# Patient Record
Sex: MALE | Race: WHITE | NOT HISPANIC OR LATINO | Employment: OTHER | URBAN - METROPOLITAN AREA
[De-identification: names, ages, dates, MRNs, and addresses within clinical notes are randomized per-mention and may not be internally consistent; named-entity substitution may affect disease eponyms.]

---

## 2019-02-13 ENCOUNTER — CONSULT (OUTPATIENT)
Dept: CARDIOLOGY | Facility: CLINIC | Age: 79
End: 2019-02-13

## 2019-02-13 VITALS
WEIGHT: 142 LBS | DIASTOLIC BLOOD PRESSURE: 76 MMHG | BODY MASS INDEX: 22.29 KG/M2 | HEIGHT: 67 IN | HEART RATE: 80 BPM | SYSTOLIC BLOOD PRESSURE: 130 MMHG

## 2019-02-13 DIAGNOSIS — I48.19 ATRIAL FIBRILLATION, PERSISTENT (HCC): ICD-10-CM

## 2019-02-13 DIAGNOSIS — I48.91 ATRIAL FIBRILLATION, UNSPECIFIED TYPE (HCC): Primary | ICD-10-CM

## 2019-02-13 DIAGNOSIS — I10 ESSENTIAL HYPERTENSION: ICD-10-CM

## 2019-02-13 DIAGNOSIS — I48.21 PERMANENT ATRIAL FIBRILLATION (HCC): Primary | ICD-10-CM

## 2019-02-13 PROCEDURE — 99203 OFFICE O/P NEW LOW 30 MIN: CPT | Performed by: INTERNAL MEDICINE

## 2019-02-13 RX ORDER — MELOXICAM 15 MG/1
15 TABLET ORAL DAILY
COMMUNITY

## 2019-02-13 RX ORDER — TRAMADOL HYDROCHLORIDE 50 MG/1
50 TABLET ORAL EVERY 12 HOURS PRN
COMMUNITY

## 2019-02-13 RX ORDER — ASPIRIN 81 MG/1
81 TABLET, CHEWABLE ORAL DAILY
COMMUNITY
End: 2020-02-19

## 2019-02-13 NOTE — PROGRESS NOTES
"Subjective:     Encounter Date:02/13/2019    Primary Care Physician: Rad Garvin DO      Patient ID: Garth Cunningham is a 78 y.o. male.    Chief Complaint:Irregular Heart Beat (consult)    PROBLEM LIST:  1. Atrial fibrillation  a. New diagnosis 2019  b. CHADSVASC- 3  2. COPD  3. Hypertension   4. Arthritis  5. Surgeries:  a. Appendectomy  b. Bilateral cataract extraction  c. Carpal tunnel surgery      No Known Allergies      Current Outpatient Medications:   •  aspirin 81 MG chewable tablet, Chew 81 mg Daily., Disp: , Rfl:   •  meloxicam (MOBIC) 15 MG tablet, Take 15 mg by mouth Daily., Disp: , Rfl:   •  METOPROLOL SUCCINATE ER PO, Take 50 mg by mouth Daily., Disp: , Rfl:   •  traMADol (ULTRAM) 50 MG tablet, Take 50 mg by mouth Every 12 (Twelve) Hours As Needed for Moderate Pain ., Disp: , Rfl:         History of Present Illness    Patient is a 78-year-old  male who we are seeing today for new diagnosis of atrial fibrillation.  He was seeing his primary care physician for a wellness exam.  At that time he was noted to have an irregular rhythm.  EKG was performed which showed atrial fibrillation.  Patient denies any chest pain, pressure, tightness.  He denies any increasing shortness of breath.  No syncope, near-syncope, or edema.  His primary complaint is of pain in his back, hips, and legs.  He was started on aspirin therapy for his atrial fibrillation.  Patient notes that he is unaware of any palpitations or arrhythmia.  He was told a long time ago by previous primary care physician that he had had a \"mild heart attack\" due to his EKG findings.  He reports that there is never been any further testing performed.    The following portions of the patient's history were reviewed and updated as appropriate: allergies, current medications, past family history, past medical history, past social history, past surgical history and problem list.    Family History   Family history unknown: Yes " "      Social History     Tobacco Use   • Smoking status: Current Every Day Smoker     Packs/day: 0.75     Start date: 1955   • Smokeless tobacco: Never Used   Substance Use Topics   • Alcohol use: No     Frequency: Never   • Drug use: Not on file         Review of Systems   Constitution: Negative for fever, weakness and malaise/fatigue.   HENT: Negative for nosebleeds.    Eyes: Positive for blurred vision. Negative for redness and visual disturbance.   Cardiovascular: Negative for orthopnea, palpitations and paroxysmal nocturnal dyspnea.   Respiratory: Negative for cough, snoring, sputum production and wheezing.    Hematologic/Lymphatic: Negative for bleeding problem.   Skin: Negative for flushing, itching and rash.   Musculoskeletal: Positive for arthritis, back pain, joint pain and muscle weakness. Negative for falls and muscle cramps.   Gastrointestinal: Positive for dysphagia. Negative for abdominal pain, diarrhea, heartburn, nausea and vomiting.   Genitourinary: Negative for hematuria.   Neurological: Negative for excessive daytime sleepiness, dizziness, headaches and tremors.   Psychiatric/Behavioral: Negative for substance abuse. The patient is not nervous/anxious.           Objective:    height is 170.2 cm (67\") and weight is 64.4 kg (142 lb). His blood pressure is 130/76 and his pulse is 80.         Physical Exam   Constitutional: He is oriented to person, place, and time. He appears well-developed and well-nourished.   HENT:   Head: Normocephalic and atraumatic.   Mouth/Throat: Oropharynx is clear and moist.   Eyes: Conjunctivae are normal. Pupils are equal, round, and reactive to light.   Neck: Normal carotid pulses and no JVD present. Carotid bruit is not present. No thyromegaly present.   Cardiovascular: Normal rate, S1 normal and S2 normal. An irregularly irregular rhythm present. Exam reveals no gallop and no friction rub.   No murmur heard.  Pulses:       Carotid pulses are 2+ on the right side, and " 2+ on the left side.       Dorsalis pedis pulses are 2+ on the right side, and 2+ on the left side.        Posterior tibial pulses are 2+ on the right side, and 2+ on the left side.   Pulmonary/Chest: No respiratory distress. He has no wheezes. He has no rales. He exhibits no tenderness.   Abdominal: He exhibits no distension, no abdominal bruit and no mass. There is no hepatosplenomegaly. There is no tenderness. There is no rebound.   Musculoskeletal: He exhibits no edema, tenderness or deformity.   Lymphadenopathy:     He has no cervical adenopathy.   Neurological: He is alert and oriented to person, place, and time. He has normal strength.   Skin: Skin is warm and dry. No rash noted. No cyanosis. Nails show no clubbing.   Psychiatric: He has a normal mood and affect. Cognition and memory are normal.         ECG 12 Lead  Date/Time: 2/13/2019 3:39 PM  Performed by: Jose Albarran MD  Authorized by: Jose Albarran MD   Rhythm: atrial fibrillation  Conduction: left anterior fascicular block                  Assessment:   Assessment/Plan      Edward was seen today for irregular heart beat.    Diagnoses and all orders for this visit:    Permanent atrial fibrillation (CMS/HCC)    Essential hypertension    Other orders  -     ECG 12 Lead      1.  Atrial fibrillation unclear duration.  Asymptomatic.  Rate controlled.  Chads fast 3.  2.  Hypertension controlled    Recommendations: Given his asymptomatic status, and high chads score would recommend lifelong Xarelto.  20 mg daily at bedtime samples given.  Also needs echocardiogram to complete work up.  Continue beta blocker for rate control.       Griselda GOODE scribed portions of this dictation for  Dr. Albarran.  I, Jose Albarran MD, personally performed the services described in this documentation as scribed by the above individual in my presence, and it is both accurate and complete    Dictated utilizing Dragon dictation

## 2019-04-03 ENCOUNTER — APPOINTMENT (OUTPATIENT)
Dept: CARDIOLOGY | Facility: HOSPITAL | Age: 79
End: 2019-04-03

## 2019-04-03 ENCOUNTER — HOSPITAL ENCOUNTER (OUTPATIENT)
Dept: CARDIOLOGY | Facility: HOSPITAL | Age: 79
Discharge: HOME | End: 2019-04-03
Admitting: INTERNAL MEDICINE

## 2019-04-03 VITALS — BODY MASS INDEX: 22.29 KG/M2 | HEIGHT: 67 IN | WEIGHT: 142 LBS

## 2019-04-03 DIAGNOSIS — I10 ESSENTIAL HYPERTENSION: ICD-10-CM

## 2019-04-03 DIAGNOSIS — I48.19 ATRIAL FIBRILLATION, PERSISTENT (HCC): ICD-10-CM

## 2019-04-03 LAB
BH CV ECHO MEAS - AO ROOT AREA (BSA CORRECTED): 2.2
BH CV ECHO MEAS - AO ROOT AREA: 11.3 CM^2
BH CV ECHO MEAS - AO ROOT DIAM: 3.8 CM
BH CV ECHO MEAS - BSA(HAYCOCK): 1.7 M^2
BH CV ECHO MEAS - BSA: 1.7 M^2
BH CV ECHO MEAS - BZI_BMI: 22.2 KILOGRAMS/M^2
BH CV ECHO MEAS - BZI_METRIC_HEIGHT: 170.2 CM
BH CV ECHO MEAS - BZI_METRIC_WEIGHT: 64.4 KG
BH CV ECHO MEAS - EDV(CUBED): 69.9 ML
BH CV ECHO MEAS - EDV(MOD-SP2): 99 ML
BH CV ECHO MEAS - EDV(MOD-SP4): 92 ML
BH CV ECHO MEAS - EDV(TEICH): 75.1 ML
BH CV ECHO MEAS - EF(CUBED): 65.1 %
BH CV ECHO MEAS - EF(MOD-BP): 57 %
BH CV ECHO MEAS - EF(MOD-SP2): 55.6 %
BH CV ECHO MEAS - EF(MOD-SP4): 58.7 %
BH CV ECHO MEAS - EF(TEICH): 57.1 %
BH CV ECHO MEAS - ESV(CUBED): 24.4 ML
BH CV ECHO MEAS - ESV(MOD-SP2): 44 ML
BH CV ECHO MEAS - ESV(MOD-SP4): 38 ML
BH CV ECHO MEAS - ESV(TEICH): 32.2 ML
BH CV ECHO MEAS - FS: 29.6 %
BH CV ECHO MEAS - IVS/LVPW: 0.65
BH CV ECHO MEAS - IVSD: 0.57 CM
BH CV ECHO MEAS - LAD MAJOR: 5.3 CM
BH CV ECHO MEAS - LAT PEAK E' VEL: 12.2 CM/SEC
BH CV ECHO MEAS - LATERAL E/E' RATIO: 8.9
BH CV ECHO MEAS - LV DIASTOLIC VOL/BSA (35-75): 52.6 ML/M^2
BH CV ECHO MEAS - LV IVRT: 0.07 SEC
BH CV ECHO MEAS - LV MASS(C)D: 86.3 GRAMS
BH CV ECHO MEAS - LV MASS(C)DI: 49.3 GRAMS/M^2
BH CV ECHO MEAS - LV SYSTOLIC VOL/BSA (12-30): 21.7 ML/M^2
BH CV ECHO MEAS - LVIDD: 4.1 CM
BH CV ECHO MEAS - LVIDS: 2.9 CM
BH CV ECHO MEAS - LVLD AP2: 7.2 CM
BH CV ECHO MEAS - LVLD AP4: 7.8 CM
BH CV ECHO MEAS - LVLS AP2: 6 CM
BH CV ECHO MEAS - LVLS AP4: 6.1 CM
BH CV ECHO MEAS - LVOT AREA (M): 4.2 CM^2
BH CV ECHO MEAS - LVOT AREA: 4.2 CM^2
BH CV ECHO MEAS - LVOT DIAM: 2.3 CM
BH CV ECHO MEAS - LVPWD: 0.88 CM
BH CV ECHO MEAS - MED PEAK E' VEL: 10.5 CM/SEC
BH CV ECHO MEAS - MEDIAL E/E' RATIO: 10.4
BH CV ECHO MEAS - MV A MAX VEL: 43.9 CM/SEC
BH CV ECHO MEAS - MV DEC TIME: 0.18 SEC
BH CV ECHO MEAS - MV E MAX VEL: 109 CM/SEC
BH CV ECHO MEAS - MV E/A: 2.5
BH CV ECHO MEAS - PA ACC SLOPE: 760 CM/SEC^2
BH CV ECHO MEAS - PA ACC TIME: 0.09 SEC
BH CV ECHO MEAS - PA PR(ACCEL): 37.6 MMHG
BH CV ECHO MEAS - PI END-D VEL: 109.3 CM/SEC
BH CV ECHO MEAS - PULM DIAS VEL: 35.9 CM/SEC
BH CV ECHO MEAS - PULM S/D: 2.1
BH CV ECHO MEAS - PULM SYS VEL: 74.9 CM/SEC
BH CV ECHO MEAS - RAP SYSTOLE: 3 MMHG
BH CV ECHO MEAS - RVSP: 33 MMHG
BH CV ECHO MEAS - SI(CUBED): 26.1 ML/M^2
BH CV ECHO MEAS - SI(MOD-SP2): 31.5 ML/M^2
BH CV ECHO MEAS - SI(MOD-SP4): 30.9 ML/M^2
BH CV ECHO MEAS - SI(TEICH): 24.5 ML/M^2
BH CV ECHO MEAS - SV(CUBED): 45.5 ML
BH CV ECHO MEAS - SV(MOD-SP2): 55 ML
BH CV ECHO MEAS - SV(MOD-SP4): 54 ML
BH CV ECHO MEAS - SV(TEICH): 42.9 ML
BH CV ECHO MEAS - TAPSE (>1.6): 1.8 CM2
BH CV ECHO MEAS - TR MAX PG: 30 MMHG
BH CV ECHO MEAS - TR MAX VEL: 276 CM/SEC
BH CV ECHO MEASUREMENTS AVERAGE E/E' RATIO: 9.6
BH CV VAS BP LEFT ARM: NORMAL MMHG
BH CV XLRA - RV BASE: 4.8 CM
BH CV XLRA - RV MID: 3.6 CM
BH CV XLRA - TDI S': 14.1 CM/SEC
LEFT ATRIUM VOLUME INDEX: 51.5 ML/M^2
LEFT ATRIUM VOLUME: 90 ML
LV EF 2D ECHO EST: 60 %

## 2019-04-03 PROCEDURE — 93306 TTE W/DOPPLER COMPLETE: CPT | Performed by: INTERNAL MEDICINE

## 2019-04-03 PROCEDURE — 93306 TTE W/DOPPLER COMPLETE: CPT

## 2019-04-11 ENCOUNTER — TELEPHONE (OUTPATIENT)
Dept: CARDIOLOGY | Facility: CLINIC | Age: 79
End: 2019-04-11

## 2019-04-11 NOTE — TELEPHONE ENCOUNTER
----- Message from RUSSEL Bui sent at 4/11/2019  1:00 PM EDT -----  Normal EF, mild MR. No significant abnormality  ----- Message -----  From: Siomara Cadet RN  Sent: 4/11/2019  12:33 PM  To: RUSSEL Bui    Patient calling for results of echo

## 2020-02-18 NOTE — PROGRESS NOTES
Subjective:     Encounter Date:02/19/2020    Primary Care Physician: Rad Garvin DO      Patient ID: Garth Cunningham is a 79 y.o. male.    Chief Complaint:Follow-up    PROBLEM LIST:  1. Atrial fibrillation  a. New diagnosis 2019  b. CHADSVASC- 3  c. 4/2019 echo EF 60%. Mild MR  2. COPD  3. Hypertension   4. Arthritis  5. Surgeries:  a. Appendectomy  b. Bilateral cataract extraction  c. Carpal tunnel surgery      No Known Allergies      Current Outpatient Medications:   •  meloxicam (MOBIC) 15 MG tablet, Take 15 mg by mouth Daily., Disp: , Rfl:   •  METOPROLOL SUCCINATE ER PO, Take 50 mg by mouth Daily., Disp: , Rfl:   •  rivaroxaban (XARELTO) 20 MG tablet, Take 1 tablet by mouth Daily., Disp: 30 tablet, Rfl: 11  •  traMADol (ULTRAM) 50 MG tablet, Take 50 mg by mouth Every 12 (Twelve) Hours As Needed for Moderate Pain ., Disp: , Rfl:         History of Present Illness    Patient returns today for follow-up of his atrial fibrillation.  Since her last visit he denies any tachypalpitations presyncope syncope.  He apparently was in the emergency room approximately 1 month ago with some chest pain, was diagnosed with costochondritis and possible pneumonia.  In retrospect patient notes this occurs after he and his friend attend a new barn lifting heavy sheets over his head for most of the day.  His pain is now resolved.  There is no exertional component to it at this time.  Issue primarily is cost of his anticoagulation.    The following portions of the patient's history were reviewed and updated as appropriate: allergies, current medications, past family history, past medical history, past social history, past surgical history and problem list.      Social History     Tobacco Use   • Smoking status: Current Every Day Smoker     Packs/day: 0.75     Start date: 1955   • Smokeless tobacco: Never Used   Substance Use Topics   • Alcohol use: No     Frequency: Never   • Drug use: Not on file         Review of  "Systems   Constitution: Negative.   Cardiovascular: Negative.    Respiratory: Negative.    Hematologic/Lymphatic: Negative for bleeding problem. Does not bruise/bleed easily.   Skin: Negative for rash.   Musculoskeletal: Positive for arthritis. Negative for muscle weakness and myalgias.   Gastrointestinal: Negative for heartburn, nausea and vomiting.   Neurological: Negative.           Objective:   /78 (BP Location: Left arm, Patient Position: Sitting)   Pulse 69   Ht 177.8 cm (70\")   Wt 63 kg (139 lb)   SpO2 97%   BMI 19.94 kg/m²         Physical Exam   Constitutional: He is oriented to person, place, and time. He appears well-developed and well-nourished.   HENT:   Mouth/Throat: Oropharynx is clear and moist.   Neck: No JVD present. Carotid bruit is not present. No thyromegaly present.   Cardiovascular: Regular rhythm, S1 normal, S2 normal, normal heart sounds and intact distal pulses. Exam reveals no gallop, no S3 and no S4.   No murmur heard.  Pulses:       Carotid pulses are 2+ on the right side, and 2+ on the left side.       Radial pulses are 2+ on the right side, and 2+ on the left side.   Pulmonary/Chest: Breath sounds normal.   Abdominal: Soft. Bowel sounds are normal. He exhibits no mass. There is no tenderness.   Musculoskeletal: He exhibits no edema.   Neurological: He is alert and oriented to person, place, and time.   Skin: Skin is warm and dry. No rash noted.       Procedures          Assessment:   Assessment/Plan      Garth was seen today for follow-up.    Diagnoses and all orders for this visit:    Paroxysmal atrial fibrillation (CMS/HCC)    Other chest pain      1.  Paroxysmal H fibrillation, on chronic anticoagulation.  Rate controlled  2.  Hypertension recently well-controlled for age  3.  COPD mildly symptomatic  4.  Chest pain musculoskeletal, resolved    Recommendations 1.  Xarelto samples and patient assistance forms given.  Revisit annually or PRN symptom change     Jose " MD Avis      Dictated utilizing Dragon dictation

## 2020-02-19 ENCOUNTER — OFFICE VISIT (OUTPATIENT)
Dept: CARDIOLOGY | Facility: CLINIC | Age: 80
End: 2020-02-19

## 2020-02-19 VITALS
BODY MASS INDEX: 19.9 KG/M2 | HEART RATE: 69 BPM | SYSTOLIC BLOOD PRESSURE: 142 MMHG | HEIGHT: 70 IN | OXYGEN SATURATION: 97 % | WEIGHT: 139 LBS | DIASTOLIC BLOOD PRESSURE: 78 MMHG

## 2020-02-19 DIAGNOSIS — I48.0 PAROXYSMAL ATRIAL FIBRILLATION (HCC): Primary | ICD-10-CM

## 2020-02-19 DIAGNOSIS — R07.89 OTHER CHEST PAIN: ICD-10-CM

## 2020-02-19 PROCEDURE — 99213 OFFICE O/P EST LOW 20 MIN: CPT | Performed by: INTERNAL MEDICINE

## 2020-04-15 RX ORDER — RIVAROXABAN 20 MG/1
TABLET, FILM COATED ORAL
Qty: 30 TABLET | Refills: 11 | Status: SHIPPED | OUTPATIENT
Start: 2020-04-15 | End: 2021-05-04 | Stop reason: SDUPTHER

## 2021-05-04 ENCOUNTER — OFFICE VISIT (OUTPATIENT)
Dept: CARDIOLOGY | Facility: CLINIC | Age: 81
End: 2021-05-04

## 2021-05-04 VITALS
DIASTOLIC BLOOD PRESSURE: 60 MMHG | SYSTOLIC BLOOD PRESSURE: 108 MMHG | BODY MASS INDEX: 23.07 KG/M2 | WEIGHT: 138.5 LBS | HEART RATE: 96 BPM | OXYGEN SATURATION: 98 % | HEIGHT: 65 IN

## 2021-05-04 DIAGNOSIS — I10 ESSENTIAL HYPERTENSION: ICD-10-CM

## 2021-05-04 DIAGNOSIS — I48.0 PAROXYSMAL ATRIAL FIBRILLATION (HCC): Primary | ICD-10-CM

## 2021-05-04 PROCEDURE — 99213 OFFICE O/P EST LOW 20 MIN: CPT | Performed by: INTERNAL MEDICINE

## 2021-05-04 NOTE — PROGRESS NOTES
"Subjective:     Encounter Date:05/04/2021    Primary Care Physician: Rad Garvin DO      Patient ID: Garth Cunningham is a 80 y.o. male.    Chief Complaint:Follow-up    PROBLEM LIST:  1. Atrial fibrillation  a. New diagnosis 2019  b. CHADSVASC- 3  c. 4/2019 echo EF 60%. Mild MR  2. COPD  3. Hypertension   4. Arthritis  5. Surgeries:  a. Appendectomy  b. Bilateral cataract extraction  c. Carpal tunnel surgery        No Known Allergies      Current Outpatient Medications:   •  meloxicam (MOBIC) 15 MG tablet, Take 15 mg by mouth Daily., Disp: , Rfl:   •  METOPROLOL SUCCINATE ER PO, Take 50 mg by mouth Daily., Disp: , Rfl:   •  traMADol (ULTRAM) 50 MG tablet, Take 50 mg by mouth Every 12 (Twelve) Hours As Needed for Moderate Pain ., Disp: , Rfl:   •  XARELTO 20 MG tablet, Take 1 tablet by mouth once daily, Disp: 30 tablet, Rfl: 11        History of Present Illness    Patient returns for annual follow-up of atrial fibrillation and hypertension since her last visit he is doing very well.  Is very active without chest pain shortness of breath tachypalpitations presyncope or syncope.  Traumatic surgery to the right hand, significant bleeding.    The following portions of the patient's history were reviewed and updated as appropriate: allergies, current medications, past family history, past medical history, past social history, past surgical history and problem list.      Social History     Tobacco Use   • Smoking status: Current Every Day Smoker     Packs/day: 0.75     Start date: 1955   • Smokeless tobacco: Never Used   Substance Use Topics   • Alcohol use: No   • Drug use: Not on file         ROS       Objective:   /60   Pulse 96   Ht 165.1 cm (65\")   Wt 62.8 kg (138 lb 8 oz)   SpO2 98%   BMI 23.05 kg/m²         Vitals reviewed.   Constitutional:       Appearance: Well-developed and not in distress.   Neck:      Thyroid: No thyromegaly.      Vascular: No carotid bruit or JVD.   Pulmonary:      " Breath sounds: Normal breath sounds.   Cardiovascular:      Regular rhythm.      No gallop. No S3 and S4 gallop.   Abdominal:      General: Bowel sounds are normal.      Palpations: Abdomen is soft. There is no abdominal mass.      Tenderness: There is no abdominal tenderness.   Musculoskeletal:         General: No deformity.      Extremities: No clubbing present.Skin:     General: Skin is warm and dry.      Findings: No rash.   Neurological:      Mental Status: Alert and oriented to person, place, and time.         Procedures          Assessment:   Assessment/Plan      Diagnoses and all orders for this visit:    1. Paroxysmal atrial fibrillation (CMS/HCC) (Primary)    2. Essential hypertension      1.  Paroxysmal atrial relation rate control and fully anticoagulated   2 hypertension currently well controlled on beta-blocker  3.  Dyslipidemia last LDL of 90  4.  COPD    Recommendations:  1.  Continue current medical therapy  2.  Issues with cost of Xarelto will attempt patient assistance programs and samples given.  3.  Revisit annually apparent symptom change       Jose Albarran MD      Dictated utilizing Dragon dictation

## 2022-05-17 RX ORDER — RIVAROXABAN 20 MG/1
TABLET, FILM COATED ORAL
Qty: 30 TABLET | Refills: 5 | Status: SHIPPED | OUTPATIENT
Start: 2022-05-17 | End: 2022-12-27

## 2022-06-01 ENCOUNTER — OFFICE VISIT (OUTPATIENT)
Dept: CARDIOLOGY | Facility: CLINIC | Age: 82
End: 2022-06-01

## 2022-06-01 VITALS
HEART RATE: 58 BPM | OXYGEN SATURATION: 97 % | HEIGHT: 65 IN | DIASTOLIC BLOOD PRESSURE: 70 MMHG | BODY MASS INDEX: 21.66 KG/M2 | WEIGHT: 130 LBS | SYSTOLIC BLOOD PRESSURE: 110 MMHG

## 2022-06-01 DIAGNOSIS — I10 ESSENTIAL HYPERTENSION: ICD-10-CM

## 2022-06-01 DIAGNOSIS — R63.4 WEIGHT LOSS: ICD-10-CM

## 2022-06-01 DIAGNOSIS — I48.0 PAROXYSMAL ATRIAL FIBRILLATION: Primary | ICD-10-CM

## 2022-06-01 PROCEDURE — 99214 OFFICE O/P EST MOD 30 MIN: CPT | Performed by: NURSE PRACTITIONER

## 2022-06-01 RX ORDER — ROPINIROLE 0.25 MG/1
0.25 TABLET, FILM COATED ORAL NIGHTLY
COMMUNITY
Start: 2022-04-28

## 2022-06-01 NOTE — PROGRESS NOTES
Subjective:     Encounter Date:06/01/2022    Primary Care Physician: Rad Garvin DO      Patient ID: Garth Cunningham is a 81 y.o. male.    Chief Complaint:Follow-up      PROBLEM LIST:  1. Atrial fibrillation  a. New diagnosis 2019  b. CHADSVASC- 3  c. 4/2019 echo EF 60%. Mild MR  2. COPD  3. Hypertension   4. Arthritis  5. Surgeries:  a. Appendectomy  b. Bilateral cataract extraction  c. Carpal tunnel surgery        No Known Allergies      Current Outpatient Medications:   •  meloxicam (MOBIC) 15 MG tablet, Take 15 mg by mouth Daily., Disp: , Rfl:   •  METOPROLOL SUCCINATE ER PO, Take 50 mg by mouth Daily., Disp: , Rfl:   •  rOPINIRole (REQUIP) 0.25 MG tablet, Take 0.25 mg by mouth Every Night., Disp: , Rfl:   •  traMADol (ULTRAM) 50 MG tablet, Take 50 mg by mouth Every 12 (Twelve) Hours As Needed for Moderate Pain ., Disp: , Rfl:   •  Xarelto 20 MG tablet, Take 1 tablet by mouth once daily, Disp: 30 tablet, Rfl: 5        History of Present Illness    Patient is a 81-year-old  male who is being seen today for annual follow-up of paroxysmal atrial fibrillation.  Since last being seen he notes to overall be doing well from cardiac standpoint.  Denies any chest pain, pressure, tightness.  Denies any increase shortness of breath.  No reported syncope, near-syncope, or edema.  However, over the last 3 to 4 months has had roughly 30 pound weight loss.  Has lost 4 more pounds since he saw his primary care last month.  Notes some decreased appetite.  However, does not note any new symptoms from a functional capacity.  Is due to follow-up with his primary care again in July.    The following portions of the patient's history were reviewed and updated as appropriate: allergies, current medications, past family history, past medical history, past social history, past surgical history and problem list.      Social History     Tobacco Use   • Smoking status: Current Every Day Smoker     Packs/day: 0.75      "Start date: 1955   • Smokeless tobacco: Never Used   Substance Use Topics   • Alcohol use: No         Review of Systems   Constitutional: Positive for decreased appetite and weight loss.   Cardiovascular: Negative for chest pain, dyspnea on exertion, leg swelling, palpitations and syncope.   Respiratory: Negative.  Negative for shortness of breath.    Hematologic/Lymphatic: Negative for bleeding problem. Does not bruise/bleed easily.   Skin: Negative for rash.   Musculoskeletal: Positive for arthritis and joint pain. Negative for muscle weakness and myalgias.   Gastrointestinal: Negative for heartburn, nausea and vomiting.   Neurological: Negative for dizziness, light-headedness, loss of balance and numbness.          Objective:   /70   Pulse 58   Ht 165.1 cm (65\")   Wt 59 kg (130 lb)   SpO2 97%   BMI 21.63 kg/m²         Vitals reviewed.   Constitutional:       Appearance: Well-developed and not in distress. Chronically ill-appearing.   Neck:      Vascular: No JVD.      Trachea: No tracheal deviation.   Pulmonary:      Effort: Pulmonary effort is normal.      Breath sounds: Normal breath sounds.   Cardiovascular:      Normal rate. Regular rhythm.   Pulses:     Intact distal pulses.   Edema:     Peripheral edema absent.   Abdominal:      General: Bowel sounds are normal.      Palpations: Abdomen is soft.      Tenderness: There is no abdominal tenderness.   Musculoskeletal:         General: No deformity. Skin:     General: Skin is warm and dry.   Neurological:      Mental Status: Alert and oriented to person, place, and time.         Procedures          Assessment:   Assessment & Plan      Diagnoses and all orders for this visit:    1. Paroxysmal atrial fibrillation (HCC) (Primary), stable.  Anticoagulated with Xarelto.  No bleeding issues.  Due for routine blood work with primary care soon.    2. Essential hypertension, controlled.  On beta-blocker.    3. Weight loss, per patient and wife report has lost " almost 30 pounds in the last 3 to 4 months.  Patient notes that he has not been eating as much.  Denies any cough or hemoptysis.  Still smoking.      Plan:  1. Continue current cardiac medications for now.  2. We will have patient follow-up with his primary care for routine blood work including BMP.  3. Encouraged increased p.o. intake to hopefully encourage some weight gain.  Discussed with patient and wife should he continue to lose weight he needs to follow-up with his primary care physician for further evaluation.  They verbalized understanding.  4. Follow-up in 1 years time or sooner if needed.         RUSSEL Bui   Dictated utilizing Dragon dictation

## 2022-12-27 RX ORDER — RIVAROXABAN 20 MG/1
TABLET, FILM COATED ORAL
Qty: 30 TABLET | Refills: 11 | Status: SHIPPED | OUTPATIENT
Start: 2022-12-27

## 2023-06-06 NOTE — PROGRESS NOTES
"Subjective:     Encounter Date:06/07/2023    Primary Care Physician: Rad Garvin DO      Patient ID: Garth Cunningham is a 82 y.o. male.    Chief Complaint:Follow-up and Atrial Fibrillation    PROBLEM LIST:  Atrial fibrillation  New diagnosis 2019  CHADSVASC- 3  4/2019 echo EF 60%. Mild MR  COPD  Hypertension   Arthritis  Surgeries:  Appendectomy  Bilateral cataract extraction  Carpal tunnel surgery  Skin cancer removal      No Known Allergies      Current Outpatient Medications:     meloxicam (MOBIC) 15 MG tablet, Take 1 tablet by mouth Daily., Disp: , Rfl:     metoprolol succinate XL (TOPROL-XL) 25 MG 24 hr tablet, Take 1 tablet by mouth Every Evening., Disp: , Rfl:     rivaroxaban (XARELTO) 20 MG tablet, Take 1 tablet by mouth Daily., Disp: , Rfl:         History of Present Illness    Patient returns today for annual follow-up of atrial fibrillation.  Since her last visit he is overall doing well.  He is having a bleeding issues with his Xarelto.  Does not feel his atrial fibrillation.  Is relatively active for age, although he notices he is \"slowing down\" and he can only work 3 to 4 hours daily before having to take a rest.  No orthopnea PND or syncope.    The following portions of the patient's history were reviewed and updated as appropriate: allergies, current medications, past family history, past medical history, past social history, past surgical history and problem list.      Social History     Tobacco Use    Smoking status: Every Day     Packs/day: 0.75     Types: Cigarettes     Start date: 1955    Smokeless tobacco: Never   Substance Use Topics    Alcohol use: No    Drug use: Never         ROS       Objective:   /78   Pulse 78   Resp 20   Ht 167.6 cm (66\")   Wt 58.1 kg (128 lb)   SpO2 98%   BMI 20.66 kg/m²         Vitals reviewed.   Constitutional:       Appearance: Well-developed and not in distress.   Neck:      Thyroid: No thyromegaly.      Vascular: No carotid bruit or JVD. "   Pulmonary:      Breath sounds: Normal breath sounds.   Cardiovascular:      Irregularly irregular rhythm.      No gallop. No S3 and S4 gallop.   Pulses:     Intact distal pulses.      Carotid: 2+ bilaterally.     Radial: 2+ bilaterally.  Edema:     Peripheral edema absent.   Abdominal:      General: Bowel sounds are normal.      Palpations: Abdomen is soft. There is no abdominal mass.      Tenderness: There is no abdominal tenderness.   Musculoskeletal:         General: No deformity.      Extremities: No clubbing present.Skin:     General: Skin is warm and dry.      Findings: No rash.   Neurological:      Mental Status: Alert and oriented to person, place, and time.       Procedures          Assessment:   Assessment & Plan      Diagnoses and all orders for this visit:    1. Paroxysmal atrial fibrillation (Primary)      1.  Persistent atrial fibrillation.  Adequately rate controlled and anticoagulated.  Asymptomatic.  [id number] 3, on Xarelto  2.  COPD  3.  Hypertension well-controlled    Recommendations:  1.  Continue current medical therapy including lifelong anticoagulation  2.  Revisit as needed.     Jose Albarran MD      Advance Care Planning   ACP discussion was declined by the patient. Patient does not have an advance directive, declines further assistance.      Jose Albarran MD    Dictated utilizing Dragon dictation

## 2023-06-07 ENCOUNTER — OFFICE VISIT (OUTPATIENT)
Dept: CARDIOLOGY | Facility: CLINIC | Age: 83
End: 2023-06-07
Payer: MEDICARE

## 2023-06-07 VITALS
DIASTOLIC BLOOD PRESSURE: 78 MMHG | RESPIRATION RATE: 20 BRPM | BODY MASS INDEX: 20.57 KG/M2 | HEART RATE: 78 BPM | OXYGEN SATURATION: 98 % | HEIGHT: 66 IN | WEIGHT: 128 LBS | SYSTOLIC BLOOD PRESSURE: 122 MMHG

## 2023-06-07 DIAGNOSIS — I48.0 PAROXYSMAL ATRIAL FIBRILLATION: Primary | ICD-10-CM

## 2023-06-07 PROCEDURE — 1160F RVW MEDS BY RX/DR IN RCRD: CPT | Performed by: INTERNAL MEDICINE

## 2023-06-07 PROCEDURE — 1159F MED LIST DOCD IN RCRD: CPT | Performed by: INTERNAL MEDICINE

## 2023-06-07 PROCEDURE — 99213 OFFICE O/P EST LOW 20 MIN: CPT | Performed by: INTERNAL MEDICINE

## 2023-06-07 RX ORDER — METOPROLOL SUCCINATE 25 MG/1
25 TABLET, EXTENDED RELEASE ORAL EVERY EVENING
COMMUNITY
Start: 2023-05-30

## 2023-09-25 ENCOUNTER — TELEPHONE (OUTPATIENT)
Dept: CARDIOLOGY | Facility: CLINIC | Age: 83
End: 2023-09-25

## 2023-09-25 NOTE — TELEPHONE ENCOUNTER
Caller: BELEN YAÑEZ    Relationship: EMERGENCY CONTACT    Callback number: [phone]   Is it ok to leave a message: [] Yes [x] No -VM NOT SET UP    Requested medication for samples: XARELTO 20 MG    How much medication does the patient currently have left: WEEK AND HALF    Who will be picking up the samples: PATS WIFE OR DAUGHTER(CAYDEN) OR PT    Do you need information about patient financial assistance for this medication: [x] Yes [] No

## 2024-11-08 ENCOUNTER — TELEPHONE (OUTPATIENT)
Dept: CARDIOLOGY | Facility: CLINIC | Age: 84
End: 2024-11-08
Payer: MEDICARE

## 2024-11-08 NOTE — TELEPHONE ENCOUNTER
Caller: fili quinonez    Relationship to patient: Emergency Contact    Best call back number: [phone]    Chief complaint: PTS WIFE CALLING IN TO GET PT APPT. STATES ITS BEEN A WHILE SINCE HE SEEN DR. RICKETTS AND PT HAD CHEST PAIN THE OTHER DAY    Type of visit: FOLLOW UP    Requested date: NEXT AVAILABLE IN [place]     If rescheduling, when is the original appointment: N/A

## 2024-12-17 RX ORDER — RIVAROXABAN 20 MG/1
20 TABLET, FILM COATED ORAL DAILY
Qty: 90 TABLET | Refills: 0 | Status: SHIPPED | OUTPATIENT
Start: 2024-12-17

## 2024-12-31 ENCOUNTER — TELEPHONE (OUTPATIENT)
Dept: CARDIOLOGY | Facility: CLINIC | Age: 84
End: 2024-12-31
Payer: MEDICARE

## 2024-12-31 NOTE — TELEPHONE ENCOUNTER
Alfreda from Dr. Peralta's office called and left voicemail that she had sent a cardiac clearance over several days ago. I didn't see anything scanned in, so I was not sure if you had with you.     Just wanted to let you know!    [phone]

## 2025-01-03 ENCOUNTER — TELEPHONE (OUTPATIENT)
Dept: CARDIOLOGY | Facility: CLINIC | Age: 85
End: 2025-01-03
Payer: MEDICARE

## 2025-01-03 NOTE — TELEPHONE ENCOUNTER
The [facility] received a fax that requires your attention. The document has been indexed to the patient’s chart for your review.      Reason for sending: EXTERNAL MEDICAL RECORD NOTIFICATION     Documents Description: CARDIAC CLEARANCE REQ-[facility]-1.3.25    Name of Sender: [facility]     Date Indexed: 1.3.25

## 2025-01-03 NOTE — TELEPHONE ENCOUNTER
Alfreda from [facility] called regarding cardiac clearance and instruction for patient blood thinner hold. Informed her that Dr. Albarran's nurse is out this week and that I was unable to locate the request form. Advised she resend the fax and I will attempt to get this filled out and returned to her ASAP. Alfreda agreeable to this-states she will fax the forms to our office.

## 2025-01-03 NOTE — TELEPHONE ENCOUNTER
Called patient regarding cardiac clearance- patient put his wife on the phone.  Informed patient wife we are not able to provide clearance since patient has not been seen at our office in over a year. Informed her he does have an upcoming appointment on Jan 15th and that we could work on clearance after that point. Informed her I will call [facility] to let them know. Patient wife verbalizes understanding and is agreeable-states she will let patient know.     Called [facility] and informed them of above. Alfreda states she will reach out to patient to reschedule procedure for after his upcoming office visit with Dr. Albarran.

## 2025-01-13 ENCOUNTER — TELEPHONE (OUTPATIENT)
Dept: CARDIOLOGY | Facility: CLINIC | Age: 85
End: 2025-01-13
Payer: MEDICARE

## 2025-01-15 ENCOUNTER — TELEPHONE (OUTPATIENT)
Dept: CARDIOLOGY | Facility: CLINIC | Age: 85
End: 2025-01-15

## 2025-01-15 NOTE — TELEPHONE ENCOUNTER
Caller: fili quinonez    Relationship to patient: Emergency Contact    Best call back number: [phone]    Chief complaint: PT WONT BE ABLE TO MAKE HIS APPOINTMENT ON 01.15.25. PER HUB WORKFLOW FURTHER REVIEW IS NEEDED. PLEASE REACH OUT TO RESCHEDULE.     Type of visit: FOLLOW UP    Requested date: ASAP     If rescheduling, when is the original appointment: 01.15.25     Additional notes: no new or worsening medical concerns

## 2025-02-19 ENCOUNTER — TELEPHONE (OUTPATIENT)
Dept: CARDIOLOGY | Facility: CLINIC | Age: 85
End: 2025-02-19

## 2025-02-19 NOTE — TELEPHONE ENCOUNTER
Caller: fili quinonez    Relationship to patient: Emergency Contact    Best call back number: [phone]    Requested date:  ASAP     If rescheduling, when is the original appointment: 02/19/25     Additional notes: PT WAS SCHEDULED FOR AN APPOINTMENT ON 02/19/25, BUT IS NOT ABLE TO MAKE IT DUE TO THE WEATHER. PT HAS BEEN RESCHEDULED FOR 04/11/25, BUT HE WOULD LIKE TO BE SEEN SOONER IF POSSIBLE.

## 2025-02-24 ENCOUNTER — TELEPHONE (OUTPATIENT)
Dept: CARDIOLOGY | Facility: CLINIC | Age: 85
End: 2025-02-24

## 2025-02-24 NOTE — TELEPHONE ENCOUNTER
REQUEST FOR CARDIAC CLEARANCE    Caller name: Garth Cunningham     Phone Number: [phone]    Surgeon's name:     Type of planned surgery: BACK INJECTIONS    Date of planned surgery: NOT SCHEDULED UNTIL DR.MARANO MERCED EASTMAN    Type of anesthesia: NOT SURE    Have you been experiencing chest pain or shortness of breath? NO    Is your doctor requesting for you to stop any of your medications prior to your surgery? XARELTO 20 MG- 3 DAYS PRIOR    Where should we fax the clearance to? [id number]                                                                     FAX- [phone]      THEY NEED TO KNOW PRIOR TO 03.06.25    PLEASE CONTACT PT WIFE TO UPDATE ON THE STATUS OF THIS REQUEST.

## 2025-03-19 ENCOUNTER — OFFICE VISIT (OUTPATIENT)
Dept: CARDIOLOGY | Facility: CLINIC | Age: 85
End: 2025-03-19
Payer: MEDICARE

## 2025-03-19 VITALS
HEIGHT: 69 IN | SYSTOLIC BLOOD PRESSURE: 146 MMHG | BODY MASS INDEX: 19.7 KG/M2 | WEIGHT: 133 LBS | DIASTOLIC BLOOD PRESSURE: 91 MMHG | OXYGEN SATURATION: 94 % | HEART RATE: 73 BPM

## 2025-03-19 DIAGNOSIS — I48.0 PAROXYSMAL ATRIAL FIBRILLATION: Primary | ICD-10-CM

## 2025-03-19 DIAGNOSIS — Z01.810 PREOP CARDIOVASCULAR EXAM: ICD-10-CM

## 2025-03-19 RX ORDER — CLONAZEPAM 0.5 MG/1
TABLET ORAL
COMMUNITY

## 2025-03-19 NOTE — PROGRESS NOTES
Subjective:     Encounter Date:03/19/2025    Primary Care Physician: Rad Garvin DO      Patient ID: Garth Cunningham is a 84 y.o. male.    Chief Complaint:Follow-up (Surgery clearance )    PROBLEM LIST:  Atrial fibrillation  New diagnosis 2019  [facility]- 3  4/2019 echo EF 60%. Mild MR  COPD  Hypertension   Arthritis  Lumbar radiculopathy  Surgeries:  Appendectomy  Bilateral cataract extraction  Carpal tunnel surgery  Skin cancer removal    Past Medical History:   Diagnosis Date    Appendicitis     Arthritis     Carpal tunnel syndrome     Cataracts, bilateral     COPD with emphysema     History of Holter monitoring     Irregular heart beat     Measles     Mumps     Myocardial infarction      Past Surgical History:   Procedure Laterality Date    APPENDECTOMY      CARPAL TUNNEL RELEASE      CATARACT EXTRACTION         No Known Allergies      Current Outpatient Medications:     clonazePAM (KlonoPIN) 0.5 MG tablet, Take 1 tablet twice a day by oral route for 30 days., Disp: , Rfl:     meloxicam (MOBIC) 15 MG tablet, Take 1 tablet by mouth Daily., Disp: , Rfl:     metoprolol succinate XL (TOPROL-XL) 25 MG 24 hr tablet, Take 1 tablet by mouth Every Evening., Disp: , Rfl:     Xarelto 20 MG tablet, Take 1 tablet by mouth once daily, Disp: 90 tablet, Rfl: 0        History of Present Illness    Patient returns today for atrial fibrillation and clearance for lumbar spinal injections.  Since her last visit, patient has no cardiovascular complaints.  He still lives at home close all of his ADLs and very active outside in the yard.  He is limited only by his lumbar radiculopathy.  No shortness of breath or chest pain.    The following portions of the patient's history were reviewed and updated as appropriate: allergies, current medications, past family history, past medical history, past social history, past surgical history and problem list.    Family History   Family history unknown: Yes       Social History  "    Tobacco Use    Smoking status: Every Day     Current packs/day: 0.75     Average packs/day: 0.8 packs/day for 70.2 years (52.7 ttl pk-yrs)     Types: Cigarettes     Start date: 1955    Smokeless tobacco: Never   Substance Use Topics    Alcohol use: No    Drug use: Never         ROS       Objective:   /91   Pulse 73   Ht 175.3 cm (69\")   Wt 60.3 kg (133 lb)   SpO2 94%   BMI 19.64 kg/m²         Vitals reviewed.   Constitutional:       Appearance: Well-developed and not in distress.   Neck:      Thyroid: No thyromegaly.      Vascular: No carotid bruit or JVD.   Pulmonary:      Breath sounds: Decreased breath sounds present.   Cardiovascular:      Irregularly irregular rhythm.      No gallop. No S3 and S4 gallop.   Pulses:     Intact distal pulses.      Carotid: 2+ bilaterally.     Radial: 2+ bilaterally.  Edema:     Peripheral edema absent.   Abdominal:      General: Bowel sounds are normal.      Palpations: Abdomen is soft. There is no abdominal mass.      Tenderness: There is no abdominal tenderness.   Musculoskeletal:         General: No deformity.      Extremities: No clubbing present.Skin:     General: Skin is warm and dry.      Findings: No rash.   Neurological:      Mental Status: Alert and oriented to person, place, and time.           ECG 12 Lead    Date/Time: 3/19/2025 12:51 PM  Performed by: Jose Albarran MD    Authorized by: Jose Albarran MD  Comparison: compared with previous ECG from 2/13/2019  Comparison to previous ECG: Borderline IVCD is new  Rhythm: atrial fibrillation  Conduction: non-specific intraventricular conduction delay                Assessment:   Assessment & Plan      Diagnoses and all orders for this visit:    1. Paroxysmal atrial fibrillation (Primary)    2. Preop cardiovascular exam    Other orders  -     ECG 12 Lead      1.  Persistent atrial fibrillation adequately anticoagulated with Xarelto.  2.  Preoperative lumbar radiculopathy injections.  3.  Ongoing tobacco " use  4.  Hypertension mildly elevated today.    Recommendations:  1.  Patient is cardiovascular stable continue current medical therapy  2.  Smoking cessation discussed, patient not ready to quit.  3.  Low cardiovascular risk for upcoming procedure, may hold Xarelto 72 hours preprocedure.  Resumenext day.   Revisit as needed    MD Griselda Mcmanus     Advance Care Planning   ACP discussion was held with the patient during this visit. Patient does not have an advance directive, declines further assistance.        Dictated utilizing Dragon dictation

## 2025-04-23 RX ORDER — RIVAROXABAN 20 MG/1
20 TABLET, FILM COATED ORAL DAILY
Qty: 90 TABLET | Refills: 0 | Status: SHIPPED | OUTPATIENT
Start: 2025-04-23

## 2025-08-01 RX ORDER — RIVAROXABAN 20 MG/1
20 TABLET, FILM COATED ORAL DAILY
Qty: 30 TABLET | Refills: 0 | Status: SHIPPED | OUTPATIENT
Start: 2025-08-01